# Patient Record
Sex: MALE
[De-identification: names, ages, dates, MRNs, and addresses within clinical notes are randomized per-mention and may not be internally consistent; named-entity substitution may affect disease eponyms.]

---

## 2018-02-28 ENCOUNTER — HOSPITAL ENCOUNTER (EMERGENCY)
Dept: HOSPITAL 25 - UCCORT | Age: 23
Discharge: HOME | End: 2018-02-28
Payer: COMMERCIAL

## 2018-02-28 VITALS — DIASTOLIC BLOOD PRESSURE: 78 MMHG | SYSTOLIC BLOOD PRESSURE: 134 MMHG

## 2018-02-28 DIAGNOSIS — J06.9: Primary | ICD-10-CM

## 2018-02-28 DIAGNOSIS — F17.200: ICD-10-CM

## 2018-02-28 PROCEDURE — G0463 HOSPITAL OUTPT CLINIC VISIT: HCPCS

## 2018-02-28 PROCEDURE — 87651 STREP A DNA AMP PROBE: CPT

## 2018-02-28 PROCEDURE — 99201: CPT

## 2018-02-28 PROCEDURE — 87502 INFLUENZA DNA AMP PROBE: CPT

## 2018-02-28 NOTE — UC
UC General HPI





- HPI Summary


HPI Summary: 





pt c/o stuffy nose, sore throat, achy and feeling warm x 2 days. wonders if flu.





- History of Current Complaint


Stated Complaint: FLU SYMPTOMS


Time Seen by Provider: 02/28/18 11:54


Hx Obtained From: Patient


Onset/Duration: Gradual Onset


Timing: Constant


Onset Severity: Mild


Current Severity: Mild


Associated Signs & Symptoms: Positive: Fever





PMH/Surg Hx/FS Hx/Imm Hx


Previously Healthy: Yes





- Surgical History


Surgical History: None





- Family History


Known Family History: Positive: Cardiac Disease





- Social History


Occupation: Employed Full-time


Lives: With Family


Substance Use Type: None


Smoking Status (MU): Current Every Day Smoker





- Immunization History


Vaccination Up to Date: Yes





Review of Systems


Constitutional: Fever


Skin: Negative


Eyes: Negative


ENT: Sore Throat, Nasal Discharge


Respiratory: Negative


Cardiovascular: Negative


Gastrointestinal: Negative


Genitourinary: Negative


Motor: Negative


Neurovascular: Negative


Musculoskeletal: Myalgia


Neurological: Negative


Psychological: Negative


Is Patient Immunocompromised?: No


All Other Systems Reviewed And Are Negative: Yes





Physical Exam


Triage Information Reviewed: Yes


Appearance: Well-Appearing


Vital Signs Reviewed: Yes


Eyes: Positive: Conjunctiva Clear


ENT: Positive: Pharyngeal erythema, Nasal congestion, TMs normal, Uvula 

midline.  Negative: Tonsillar swelling, Tonsillar exudate, Trismus, Muffled 

voice, Hoarse voice, Sinus tenderness


Neck: Positive: Supple, Nontender, No Lymphadenopathy


Respiratory: Positive: Lungs clear, Normal breath sounds, No respiratory 

distress


Cardiovascular: Positive: RRR, No Murmur


Abdomen Description: Positive: Nontender, No Organomegaly, Soft


Bowel Sounds: Positive: Present


Musculoskeletal: Positive: No Edema


Neurological: Positive: Alert


Psychological: Positive: Age Appropriate Behavior


Skin Exam: Normal





Diagnostics





- Laboratory


Diagnostic Studies Completed/Ordered: rapid strep and flu are negative. c/w 

viral uri. tx supportive. antibiotics not indicated





Course/Dx





- Course


Course Of Treatment: uri, antibiotics not indicated





- Differential Dx - Multi-Symptom


Provider Diagnoses: URI





Discharge





- Discharge Plan


Condition: Stable


Disposition: HOME


Patient Education Materials:  Upper Respiratory Infection (ED)


Forms:  *Work Release


Referrals: 


ANNE Jean [Primary Care Provider] - 7 Days

## 2019-03-20 ENCOUNTER — HOSPITAL ENCOUNTER (EMERGENCY)
Dept: HOSPITAL 25 - UCCORT | Age: 24
Discharge: HOME | End: 2019-03-20
Payer: COMMERCIAL

## 2019-03-20 VITALS — SYSTOLIC BLOOD PRESSURE: 157 MMHG | DIASTOLIC BLOOD PRESSURE: 86 MMHG

## 2019-03-20 DIAGNOSIS — B34.9: Primary | ICD-10-CM

## 2019-03-20 DIAGNOSIS — F17.210: ICD-10-CM

## 2019-03-20 LAB
FLUAV RNA SPEC QL NAA+PROBE: NEGATIVE
FLUBV RNA SPEC QL NAA+PROBE: NEGATIVE

## 2019-03-20 PROCEDURE — G0463 HOSPITAL OUTPT CLINIC VISIT: HCPCS

## 2019-03-20 PROCEDURE — 99211 OFF/OP EST MAY X REQ PHY/QHP: CPT

## 2019-03-20 NOTE — UC
FLU HPI





- HPI Summary


HPI Summary: 





Pt presents with c/o sudden of fever, ST, body aches X 3 days. 





- History of Current Complaint


Chief Complaint: UCGeneralIllness


Stated Complaint: BODY ACHES/FEVER/CONGESTION


Time Seen by Provider: 03/20/19 10:13


Hx Obtained From: Patient


Onset/Duration: Sudden Onset, Lasting Days, Still Present


Severity Currently: Moderate


Severity Initially: Moderate


Pain Intensity: 5


Associated Signs & Symptoms: Positive: Fever, Myalgia


Related Hx: Possible Flu/Infectious Exposure





- Risk Factors


Influenza Risk Factors: Negative





- Allergy/Home Medications


Allergies/Adverse Reactions: 


 Allergies











Allergy/AdvReac Type Severity Reaction Status Date / Time


 


No Known Allergies Allergy   Verified 03/20/19 10:03














PMH/Surg Hx/FS Hx/Imm Hx


Previously Healthy: Yes





- Surgical History


Surgical History: None





- Family History


Known Family History: Positive: Cardiac Disease





- Social History


Occupation: Employed Full-time


Lives: With Family


Alcohol Use: None


Substance Use Type: None


Smoking Status (MU): Current Every Day Smoker


Type: Cigarettes


Have You Smoked in the Last Year: Yes





- Immunization History


Vaccination Up to Date: Yes





Review of Systems


All Other Systems Reviewed And Are Negative: Yes


Constitutional: Positive: Fever, Chills, Fatigue


Skin: Positive: Negative


Eyes: Positive: Negative


ENT: Positive: Negative


Respiratory: Positive: Negative


Cardiovascular: Positive: Negative


Gastrointestinal: Positive: Negative


Genitourinary: Positive: Negative


Motor: Positive: Negative


Neurovascular: Positive: Negative


Musculoskeletal: Positive: Myalgia


Neurological: Positive: Negative


Psychological: Positive: Negative


Is Patient Immunocompromised?: No





Physical Exam


Triage Information Reviewed: Yes


Appearance: Ill-Appearing


Vital Signs: 


 Initial Vital Signs











Temp  100 F   03/20/19 10:03


 


Pulse  106   03/20/19 10:03


 


Resp  16   03/20/19 10:03


 


BP  157/86   03/20/19 10:03


 


Pulse Ox  87   03/20/19 10:03











Vital Signs Reviewed: Yes


Eye Exam: Normal


ENT Exam: Normal


Dental Exam: Normal


Neck exam: Normal


Respiratory Exam: Normal


Cardiovascular Exam: Normal


Musculoskeletal Exam: Normal


Neurological Exam: Normal


Psychological Exam: Normal


Skin Exam: Normal





Flu Course/Dx





- Differential Dx/Diagnosis


Differential Diagnosis/HQI/PQRI: Influenza, Upper Respiratory Infection


Provider Diagnosis: 


 Viral syndrome








Discharge





- Sign-Out/Discharge


Documenting (check all that apply): Patient Departure


All imaging exams completed and their final reports reviewed: No Studies





- Discharge Plan


Condition: Stable


Disposition: HOME


Patient Education Materials:  Viral Syndrome (ED)


Forms:  *Work Release


Referrals: 


ANNE Jean [Primary Care Provider] - If Needed





- Billing Disposition and Condition


Condition: STABLE


Disposition: Home